# Patient Record
Sex: MALE | Race: WHITE | NOT HISPANIC OR LATINO | Employment: FULL TIME | URBAN - METROPOLITAN AREA
[De-identification: names, ages, dates, MRNs, and addresses within clinical notes are randomized per-mention and may not be internally consistent; named-entity substitution may affect disease eponyms.]

---

## 2024-03-28 ENCOUNTER — HOSPITAL ENCOUNTER (EMERGENCY)
Facility: CLINIC | Age: 44
Discharge: HOME | End: 2024-03-28
Attending: STUDENT IN AN ORGANIZED HEALTH CARE EDUCATION/TRAINING PROGRAM | Admitting: STUDENT IN AN ORGANIZED HEALTH CARE EDUCATION/TRAINING PROGRAM
Payer: COMMERCIAL

## 2024-03-28 VITALS
BODY MASS INDEX: 21.48 KG/M2 | HEART RATE: 82 BPM | HEIGHT: 69 IN | TEMPERATURE: 98 F | WEIGHT: 145 LBS | DIASTOLIC BLOOD PRESSURE: 85 MMHG | SYSTOLIC BLOOD PRESSURE: 133 MMHG | RESPIRATION RATE: 16 BRPM | OXYGEN SATURATION: 99 %

## 2024-03-28 DIAGNOSIS — R56.9 SEIZURE (H): ICD-10-CM

## 2024-03-28 PROCEDURE — 99283 EMERGENCY DEPT VISIT LOW MDM: CPT

## 2024-03-28 ASSESSMENT — ACTIVITIES OF DAILY LIVING (ADL)
ADLS_ACUITY_SCORE: 35
ADLS_ACUITY_SCORE: 35

## 2024-03-28 ASSESSMENT — COLUMBIA-SUICIDE SEVERITY RATING SCALE - C-SSRS
6. HAVE YOU EVER DONE ANYTHING, STARTED TO DO ANYTHING, OR PREPARED TO DO ANYTHING TO END YOUR LIFE?: NO
1. IN THE PAST MONTH, HAVE YOU WISHED YOU WERE DEAD OR WISHED YOU COULD GO TO SLEEP AND NOT WAKE UP?: NO
2. HAVE YOU ACTUALLY HAD ANY THOUGHTS OF KILLING YOURSELF IN THE PAST MONTH?: NO

## 2024-03-28 NOTE — ED NOTES
Bed: ED14  Expected date: 3/28/24  Expected time: 6:11 PM  Means of arrival:   Comments:  [id number] Seizure

## 2024-03-28 NOTE — ED TRIAGE NOTES
"Patient was at work and was starting to act \"weird\". Patient was not responding appropriately and was flailing his arms. His co workers called 911. Patient has a history of seizures. Patient has been taking all his medications appropriately. . Patient is alert and oriented x4.      Triage Assessment (Adult)       Row Name 03/28/24 1836          Triage Assessment    Airway WDL WDL        Respiratory WDL    Respiratory WDL WDL        Skin Circulation/Temperature WDL    Skin Circulation/Temperature WDL WDL        Cardiac WDL    Cardiac WDL WDL        Peripheral/Neurovascular WDL    Peripheral Neurovascular WDL WDL        Cognitive/Neuro/Behavioral WDL    Cognitive/Neuro/Behavioral WDL WDL                     "

## 2024-03-29 NOTE — ED PROVIDER NOTES
"  History     Chief Complaint:  Seizures       HPI   John Winkelmann is a 43 year old male history of seizures, on Dilantin and Depakote, brought by EMS with concerns of seizure at work.  Patient was seated when he started flailing his arms around per bystanders.  He was confused afterwards and now is completely back to normal.  He has no current symptoms and he feels very well.  No headache.  No vision changes.  There was no trauma or fall or injury.  He has been sleeping slightly less than normal, but otherwise tolerating p.o. without issue.  No fever.  No alcohol or substance abuse.    Patient is compliant with his medications.    Patient has had 1 other seizure this year.  Independent Historian:    none    Review of External Notes:  none    Allergies:  No Known Allergies     Physical Exam   Patient Vitals for the past 24 hrs:   BP Temp Temp src Pulse Resp SpO2 Height Weight   03/28/24 1906 -- -- -- -- -- -- -- 65.8 kg (145 lb)   03/28/24 1848 -- -- -- -- -- 99 % -- --   03/28/24 1845 133/85 98  F (36.7  C) Tympanic 82 16 -- 1.753 m (5' 9\") --        Physical Exam  GENERAL: Patient well-appearing.  Sitting comfortably watching TV.  HEAD: Atraumatic.  EYES: Anicteric  NOSE: No active bleeding  MOUTH: Moist mucosa  THROAT: Patent airway.   NECK: No rigidity  CV: RRR, no murmurs rubs or gallops  PULM: CTAB with good aeration; no retractions, rales, rhonchi, or wheezing  ABD: Soft, nontender, nondistended, no guarding, no peritoneal signs   DERM: No rash. Skin warm and dry  EXTREMITY: Moving all extremities without difficulty. No calf tenderness or peripheral edema  VASCULAR: Symmetric pulses bilaterally  NEURO: A,Ox3. CN 2-12 fully intact. Strength 5/5 bilateral LE/UE. Sensation fully intact to light touch symmetrically bilateral LE/UE. Normal finger-to-nose and heel to shin.        Emergency Department Course          Laboratory: Imaging:   Labs Ordered and Resulted from Time of ED Arrival to Time of ED Departure - " No data to display  No orders to display              Emergency Department Course & Assessments:             Interventions:  Medications - No data to display     Assessments, Independent Interpretation, Consult/Discussion of ManagementTests:       Social Determinants of Health affecting care:  None    Disposition:  The patient was discharged to home.     Impression & Plan         Medical Decision Making:  Patient presents after seizure.     Chronic conditions complicating - seizures     DDx considered meningitis, encephalitis, electrolyte disturbance, poisoning, bleed, however evaluation not consistent with these etiologies.    Patient at neurologic baseline.  He has no current symptoms.  Do not think we require labs or imaging or ECG.    No seizure activity while in ED. Patient feeling well and ready to go home.    Patient states he can follow-up closely with his neurologist.    I have evaluated the patient for acute medical emergencies and have clinically decided no further acute medical interventions are required. Patient stable for discharge. All questions answered. Given strict return precautions. Patient content with plan. The differential diagnosis and treatment modalities were discussed thoroughly with the patient. Given seizure precautions for home including no driving, unattended water exposure or operating heavy machinery.       Diagnosis:    ICD-10-CM    1. Seizure (H)  R56.9            Discharge Medications:  New Prescriptions    No medications on file          3/28/2024   Milad Lagos MD Foss, Kevin, MD  03/28/24 1953

## 2024-03-29 NOTE — DISCHARGE INSTRUCTIONS
Discharge Instructions  Recurrent Seizure (Convulsion)    You were seen today for a seizure. The most common reason for a recurrent seizure is having missed a dose of your medication or taken it at a different time than normal. Other things that increase the risk of seizures include fever, sleep deprivation, alcohol, and stress. Although anti-seizure medications (anti-epileptic drugs) work for many people with seizure disorders, some people continue to have seizures even after trying several medications.    Generally, every Emergency Department visit should have a follow-up clinic visit with either a primary or a specialty clinic/provider. Please follow-up as instructed by your emergency provider today.    Return to the Emergency Department if:   You develop a fever over 100.4 F.  You feel much more ill, or develop new symptoms like severe headache.  You have trouble walking, seeing, or develop weakness or numbness in your arms or legs.     What can I do to help myself?  Take your medication exactly as directed, at the right times, and the right doses.   If you develop uncomfortable side effects, do not stop taking your anti-seizure medication without first speaking to your provider.   Do not let your prescription run out. Stopping anti-seizure medication abruptly can put you at risk of a seizure.  While taking an anti-seizure medication, do not start taking any other medications including over-the-counter medications and herbal supplements without first checking with your provider because mixing them can be dangerous.  Do not drive until you have been rechecked by your provider and have been told it is safe to drive.  If you have a seizure while driving you may cause a motor vehicle accident with injury or death to yourself or others.   Do not swim, climb ladders, or do anything else that would be dangerous if you had another seizure or spell of loss of consciousness, until you are cleared by your provider.     Check your state driving requirements for patients with seizures on the [employer] Website at https://www.epilepsy.com/lifestyle/driving-and-transportation/laws.  Do not drink alcohol.  Drinking alcohol increases the risk of seizures and can interfere with the effect of anti-seizure medications.  Start a seizure calendar to record any seizure triggers, such as days when you were sleep-deprived, stressed, drank alcohol, or (if you are a woman) had your period.  Remember, if one medication does not work for you, either because you cannot tolerate the side effects or because you continue to have seizures, your provider can suggest alternate medications or alternate methods of taking the medication.  If you were given a prescription for medicine here today, be sure to read all of the information (including the package insert) that comes with your prescription.  This will include important information about the medicine, its side effects, and any warnings that you need to know about.  The pharmacist who fills the prescription can provide more information and answer questions you may have about the medicine.  If you have questions or concerns that the pharmacist cannot address, please call or return to the Emergency Department.   Remember that you can always come back to the Emergency Department if you are not able to see your regular provider in the amount of time listed above, if you get any new symptoms, or if there is anything that worries you.  Return to the Emergency Department if your symptoms worsen, become concerning, or for any other concerns. Follow-up with your doctor in 2-3 days.    No swimming, driving, operating heavy machinery or doing any activity that would put your in danger if you were to have a seizure.     Follow-up with neurology.